# Patient Record
Sex: MALE | URBAN - METROPOLITAN AREA
[De-identification: names, ages, dates, MRNs, and addresses within clinical notes are randomized per-mention and may not be internally consistent; named-entity substitution may affect disease eponyms.]

---

## 2024-08-13 ENCOUNTER — APPOINTMENT (RX ONLY)
Dept: URBAN - METROPOLITAN AREA CLINIC 114 | Facility: CLINIC | Age: 64
Setting detail: DERMATOLOGY
End: 2024-08-13

## 2024-08-13 DIAGNOSIS — L72.8 OTHER FOLLICULAR CYSTS OF THE SKIN AND SUBCUTANEOUS TISSUE: ICD-10-CM

## 2024-08-13 DIAGNOSIS — L72.0 EPIDERMAL CYST: ICD-10-CM

## 2024-08-13 PROCEDURE — ? DEFER

## 2024-08-13 PROCEDURE — ? ADDITIONAL NOTES

## 2024-08-13 PROCEDURE — ? PRESCRIPTION MEDICATION MANAGEMENT

## 2024-08-13 PROCEDURE — ? COUNSELING

## 2024-08-13 PROCEDURE — 99203 OFFICE O/P NEW LOW 30 MIN: CPT

## 2024-08-13 ASSESSMENT — LOCATION SIMPLE DESCRIPTION DERM: LOCATION SIMPLE: LEFT ANTERIOR NECK

## 2024-08-13 ASSESSMENT — LOCATION DETAILED DESCRIPTION DERM: LOCATION DETAILED: LEFT CLAVICULAR NECK

## 2024-08-13 ASSESSMENT — LOCATION ZONE DERM: LOCATION ZONE: NECK

## 2024-08-13 NOTE — PROCEDURE: ADDITIONAL NOTES
Additional Notes: 8/13/24\\nPt states he has had this cyst for 4 years and it flared up days ago so he went to an urgent care to have it drained then visited his pcp who gave him doxycycline, Clindamycin, and sulfa antibiotics. I advised him it is not safe to take this many antibiotics so he can discontinue the Clindamycin and sulfa and continue the doxycycline. In two weeks after taking the medication we will schedule an excision to have this removed.
Detail Level: Simple
Render Risk Assessment In Note?: no

## 2024-08-13 NOTE — PROCEDURE: DEFER
Procedure To Be Performed At Next Visit: Excision
Detail Level: Detailed
Instructions (Optional): Excision in two weeks
X Size Of Lesion In Cm (Optional): 3
Introduction Text (Please End With A Colon): The following procedure was deferred:

## 2024-08-29 ENCOUNTER — APPOINTMENT (RX ONLY)
Dept: URBAN - METROPOLITAN AREA CLINIC 114 | Facility: CLINIC | Age: 64
Setting detail: DERMATOLOGY
End: 2024-08-29

## 2024-08-29 DIAGNOSIS — L72.8 OTHER FOLLICULAR CYSTS OF THE SKIN AND SUBCUTANEOUS TISSUE: ICD-10-CM

## 2024-08-29 PROCEDURE — ? COUNSELING

## 2024-08-29 PROCEDURE — ? SURGICAL DECISION MAKING

## 2024-08-29 PROCEDURE — 99213 OFFICE O/P EST LOW 20 MIN: CPT

## 2024-08-29 ASSESSMENT — LOCATION SIMPLE DESCRIPTION DERM: LOCATION SIMPLE: LEFT ANTERIOR NECK

## 2024-08-29 ASSESSMENT — LOCATION DETAILED DESCRIPTION DERM: LOCATION DETAILED: LEFT CLAVICULAR NECK

## 2024-08-29 ASSESSMENT — LOCATION ZONE DERM: LOCATION ZONE: NECK

## 2024-08-29 NOTE — PROCEDURE: SURGICAL DECISION MAKING
Risk Assessment Explanation (Does Not Render In The Note): Clinical determination of the probability and/or consequences of an event, such as surgery. Clinical assessment of the level of risk is affected by the nature of the event under consideration for the patient. Modifier 57 is used to indicate an Evaluation and Management (E/M) service resulted in the initial decision to perform surgery either the day before a major surgery (90 day global) or the day of a major surgery.
Surgery/Procedure Discussed: Patient has been having difficulty articulating the past few days, offered calling emergency medical to have patient evaluated at Emergency Room. Patient deferred and requested to have  called and transported to [facility] and then go to emergency.\\n\\nObijal was counseled on the possibility of stroke and is displaying symptoms that could be concerning for stroke; strongly advised to seek medical attention immediately. Patient deferred and waited in the room for 20 minutes to catch their breath. He justified his speech difficulties today as being nervous and startled because he didn’t remember what today’s apt was for, however I told him that would be very unusual he has difficulty finding words which is concerning for stroke if this is not his baseline. It was hard to know what he believes his baseline is. I informed him I can not perform excision on him that is unnecessary when he may be having a medical emergency, he still insisted he does not want to go to ER.\\n\\nPatient could also not remember the intention of o/v today.\\nPatient was able to travel to the [facility] and patient’s daughter (on HIPAA) was left a detailed VM communicating the current situation, recommendations, as well as the deference of patient to seek medical emergency evaluation/care.
Initial Decision For Surgery?: No
Identified Risk Factors Documented?: yes